# Patient Record
Sex: FEMALE | Race: WHITE | NOT HISPANIC OR LATINO | URBAN - METROPOLITAN AREA
[De-identification: names, ages, dates, MRNs, and addresses within clinical notes are randomized per-mention and may not be internally consistent; named-entity substitution may affect disease eponyms.]

---

## 2018-11-07 ENCOUNTER — EMERGENCY (EMERGENCY)
Facility: HOSPITAL | Age: 23
LOS: 0 days | Discharge: HOME | End: 2018-11-07
Admitting: PHYSICIAN ASSISTANT
Payer: COMMERCIAL

## 2018-11-07 VITALS
HEART RATE: 65 BPM | DIASTOLIC BLOOD PRESSURE: 65 MMHG | TEMPERATURE: 96 F | SYSTOLIC BLOOD PRESSURE: 118 MMHG | RESPIRATION RATE: 18 BRPM | OXYGEN SATURATION: 98 %

## 2018-11-07 DIAGNOSIS — M79.662 PAIN IN LEFT LOWER LEG: ICD-10-CM

## 2018-11-07 DIAGNOSIS — R60.0 LOCALIZED EDEMA: ICD-10-CM

## 2018-11-07 PROCEDURE — 93970 EXTREMITY STUDY: CPT | Mod: 26

## 2018-11-07 NOTE — ED PROVIDER NOTE - MEDICAL DECISION MAKING DETAILS
doppler prelim. negative; pt will follow up with PCP in 2-3 days; any new or worsening symptoms, pt will return to ER. doppler prelim. negative; pt will follow up with PCP in 2-3 days; any new or worsening symptoms, pt will return to ER.  Note complete

## 2018-11-07 NOTE — ED PROVIDER NOTE - PROVIDER TOKENS
FREE:[LAST:[[facility] Dr[facility]uhail],PHONE:[(   )    -],FAX:[(   )    -],ADDRESS:[Friday as scheduled]]

## 2018-11-07 NOTE — ED PROVIDER NOTE - OBJECTIVE STATEMENT
22 y.o. female with a PMHx of enuresis presented to the ER stating that her Left calf was bigger than her Right calf two days ago when she was trying on a boot.  Pt denies pain, trauma, travel, OCP use, chest pain, dizziness, SOB, family h/o clots.  Pt with mother at bedside. Anxious.

## 2018-11-07 NOTE — ED PROVIDER NOTE - PSYCHIATRIC, MLM
Alert and oriented to person, place, time/situation. mildly anxious mood and affect. no apparent risk to self or others.

## 2018-11-07 NOTE — ED PROVIDER NOTE - MUSCULOSKELETAL, MLM
no pedal edema noted to bilateral lower extremities, no calf tenderness bilaterally, pedal pulses 2+ bilaterally, Homans negative bilaterally

## 2019-04-05 PROBLEM — Z00.00 ENCOUNTER FOR PREVENTIVE HEALTH EXAMINATION: Status: ACTIVE | Noted: 2019-04-05

## 2019-06-10 ENCOUNTER — APPOINTMENT (OUTPATIENT)
Dept: NEUROLOGY | Facility: CLINIC | Age: 24
End: 2019-06-10

## 2019-06-10 ENCOUNTER — APPOINTMENT (OUTPATIENT)
Dept: NEUROLOGY | Facility: CLINIC | Age: 24
End: 2019-06-10
Payer: COMMERCIAL

## 2019-06-10 VITALS
HEART RATE: 75 BPM | WEIGHT: 144 LBS | SYSTOLIC BLOOD PRESSURE: 123 MMHG | BODY MASS INDEX: 25.52 KG/M2 | HEIGHT: 63 IN | DIASTOLIC BLOOD PRESSURE: 90 MMHG

## 2019-06-10 DIAGNOSIS — G31.9 DEGENERATIVE DISEASE OF NERVOUS SYSTEM, UNSPECIFIED: ICD-10-CM

## 2019-06-10 DIAGNOSIS — N39.44 NOCTURNAL ENURESIS: ICD-10-CM

## 2019-06-10 PROCEDURE — 99214 OFFICE O/P EST MOD 30 MIN: CPT

## 2019-06-10 NOTE — PHYSICAL EXAM
[FreeTextEntry1] : Orientation: oriented to person, oriented to place and oriented to time. \par Attention: normal concentrating ability and visual attention was not decreased. \par Language: no difficulty naming common objects, no difficulty repeating a phrase, no difficulty writing a sentence, fluency intact, comprehension intact and reading intact. \par Fund of knowledge: displays adequate knowledge of personal past history. \par Cranial Nerves: visual acuity intact bilaterally, visual fields full to confrontation, pupils equal round and reactive to light, extraocular motion intact  lack of smooth pursuit, facial sensation intact symmetrically, face symmetrical, hearing was intact bilaterally, tongue and palate midline, head turning and shoulder shrug symmetric and there was no tongue deviation with protrusion. \par Motor: muscle tone was normal in all four extremities, muscle strength was normal in all four extremities and normal bulk in all four extremities. \par Sensory exam: light touch, PP, Vibration was intact. \par Coordination:. dysmetria b/l on FTN and HTS; rebound on muscle testing. unable to tandem \par Deep tendon reflexes: \par absent reflexes throughout\par Plantar responses normal on the right, normal on the left.  \par

## 2019-06-10 NOTE — HISTORY OF PRESENT ILLNESS
[FreeTextEntry1] : Patient seen and examined for follow up.  She had her MRI brain done in [facility] but results were not sent prior to the visit.  She has not wet her bed since May 25th.  She is setting an alarm on her phone to wake her up every 2 hours to goto the bathroom.  during the day she has to use the bathroom as long as every 3 hours.  Can not usually hold it past 3 hours.  She doesn’t know what time is the last time she drinks before bed.  Goes to sleep around 8-9 and wakes up at usually 7:30am.\par NO new issues\par \par See prior intial visit note below from 1/7/2019 he is:\par Ms. Luu is a 23-year-old woman with past medical history of speech delay, cerebellar atrophy and some cognitive delay who was referred by primary medical doctor, Dr. Muñoz for evaluation of nocturia.  Patient states since 2013, she began having bedwetting episodes.  This occurs almost every night and sometimes every other night; however, they have not found way to treat it yet.  She has been seeing urologist and urologist tried a nasal spray, which eventually worked and then they gave her a pill; however they have not filled the prescription as prescription was $450 and they were afraid of the side effects of the medicine.  She also was noted to have asymmetry in her legs with the left calf wider than the right.  She had ultrasound done of the lower extremities, which was negative for any clots.  Family is unclear whether this is something new or something she has had since she was younger.  Mother says because of how stressful it was when she was younger. She does not have much recollection of Lexus’s childhood.  She was born normal spontaneous vaginal delivery.  She began walking before [age] year of age; however, was constantly falling and off balance began having physical therapy at that time.  She also had speech delay and did not start talking until about [age] years of age.  She has been developmental delayed since then.  She graduated from high school and has now finished first semester or freshmen year at [employer].  She took nine credits for first year and she has B+ average after the end of the semester taking some of the core classes.  She was worked up in the past according to the mother looking for many causes for why she had these issues; however, mother does not recall if anything was discovered.  There were no complications during the pregnancy or during the delivery.  She also has sibling, which is healthy with no issues.  She has a cousin who has epilepsy.  She has no urinary incontinence during the day, however, she has urinary urgency and if she has the urge to go to the bathroom she does not get to the bathroom immediately.  She will lose urine.  She has no headaches.  No neck pain.  No back pain.  No paresthesias.  No diplopia.  No problems with swallowing.  Her speech is slightly staccato; however, mother says it is better than when she was younger.  She sometimes gets dizzy if she gets up too quickly, but does not get dizzy if she turns too quickly.  She has a hard time focusing.  She has to usually think a little bit longer before she talks to find the right words.  No other medical issues.  No other asymmetry noted on her body.  She did have an MRI when she was young showing cerebellar atrophy.\par \par At this point he is going to my

## 2019-06-10 NOTE — DISCUSSION/SUMMARY
[FreeTextEntry1] : Lexus is a 23-year-old with a history of cerebellar atrophy since birth with cerebellar signs on exam here for followup of nocturnal enuresis. Likely this is related to a bladder issue such as a small bladder she should followup with a urologist for further testing such as urodynamics. She can followup with me as needed

## 2024-11-14 ENCOUNTER — EMERGENCY (EMERGENCY)
Facility: HOSPITAL | Age: 29
LOS: 0 days | Discharge: HOME | End: 2024-11-14
Attending: EMERGENCY MEDICINE
Payer: COMMERCIAL

## 2024-11-14 VITALS
OXYGEN SATURATION: 98 % | HEART RATE: 81 BPM | SYSTOLIC BLOOD PRESSURE: 141 MMHG | TEMPERATURE: 97 F | RESPIRATION RATE: 20 BRPM | DIASTOLIC BLOOD PRESSURE: 90 MMHG

## 2024-11-14 DIAGNOSIS — Z86.69 PERSONAL HISTORY OF OTHER DISEASES OF THE NERVOUS SYSTEM AND SENSE ORGANS: ICD-10-CM

## 2024-11-14 DIAGNOSIS — R29.810 FACIAL WEAKNESS: ICD-10-CM

## 2024-11-14 DIAGNOSIS — G51.0 BELL'S PALSY: ICD-10-CM

## 2024-11-14 PROCEDURE — 99284 EMERGENCY DEPT VISIT MOD MDM: CPT

## 2024-11-14 PROCEDURE — 99284 EMERGENCY DEPT VISIT MOD MDM: CPT | Mod: 25

## 2024-11-14 PROCEDURE — 70450 CT HEAD/BRAIN W/O DYE: CPT | Mod: 26,MC

## 2024-11-14 PROCEDURE — 82962 GLUCOSE BLOOD TEST: CPT

## 2024-11-14 PROCEDURE — 70450 CT HEAD/BRAIN W/O DYE: CPT | Mod: MC

## 2024-11-14 RX ORDER — VALACYCLOVIR HYDROCHLORIDE 500 MG/1
1 TABLET ORAL
Qty: 21 | Refills: 0
Start: 2024-11-14 | End: 2024-11-20

## 2024-11-14 RX ORDER — ACETAMINOPHEN 500 MG
650 TABLET ORAL ONCE
Refills: 0 | Status: COMPLETED | OUTPATIENT
Start: 2024-11-14 | End: 2024-11-14

## 2024-11-14 RX ORDER — GLYCERIN/PROPYLENE GLYCOL 0.6 %-0.6%
1 DROPPERETTE, SINGLE-USE DROP DISPENSER OPHTHALMIC (EYE)
Qty: 1 | Refills: 0
Start: 2024-11-14 | End: 2024-11-23

## 2024-11-14 RX ADMIN — Medication 650 MILLIGRAM(S): at 19:12

## 2024-11-14 RX ADMIN — Medication 60 MILLIGRAM(S): at 17:23

## 2024-11-14 NOTE — ED PROVIDER NOTE - CLINICAL SUMMARY MEDICAL DECISION MAKING FREE TEXT BOX
Clinical picture concerning for Bell's palsy.  Symptoms started today and patient immediately started on steroids.  Head CT shows no acute changes and patient has known cerebellar atrophy.  Patient's gait is at baseline.  Medications of treatment and percentage chance of recovery discussed in detail.  Will discharge on steroid course with antiviral agent.  Patient has follow-up with neurologist in 4 weeks and will give rapid referral to ENT.  Dry eye and home care discussed in detail and all questions and concerns addressed.  Patient given CAT scan report.    Full DC instructions discussed and patient knows when to seek immediate medical attention.  Patient has proper follow up.  All results discussed and patient aware they may require further work up.  Proper follow up ensured. Limitations of ED work up discussed.  Medications administered and prescribed/OTC home meds discussed.  All questions and concerns from patient or family addressed. Understanding of instructions verbalized.

## 2024-11-14 NOTE — ED PROVIDER NOTE - PATIENT PORTAL LINK FT
You can access the FollowMyHealth Patient Portal offered by [facility] by registering at the following website: [email or web address]. By joining [facility] FollowMyHealth portal, you will also be able to view your health information using other applications (apps) compatible with our system.

## 2024-11-14 NOTE — ED PROVIDER NOTE - NSFOLLOWUPINSTRUCTIONS_ED_ALL_ED_FT
Bell's Palsy: Care Instructions  Affected side of a man's face with Bell's palsy  Overview  Bell's palsy is paralysis or weakness of the muscles on one side of the face. Often people with Bell's palsy have a droop on one side of the mouth and have trouble completely shutting the eye on the same side. Bell's palsy can also interfere with the sense of taste. These things happen when a nerve in your face becomes inflamed. Bell's palsy is not caused by a stroke. The cause of the nerve inflammation is not known.    Bell's palsy usually gets better on its own in a few months. In some cases medicine is prescribed.    Follow-up care is a key part of your treatment and safety. Be sure to make and go to all appointments, and call your doctor or nurse advice line ([phone] in most provinces and territories) if you are having problems. It's also a good idea to know your test results and keep a list of the medicines you take.    How can you care for yourself at home?  Take your medicines exactly as prescribed. Call your doctor or nurse advice line if you think you are having a problem with your medicine. You will get more details on the specific medicines your doctor prescribes.  Use artificial tears or ointment if your eyes are too dry. Bell's palsy can make your lower eyelid droop, causing a dry eye.  If you cannot completely close your eye, consider using clear medical tape to tape your eye shut while you sleep.  Wear glasses or goggles to keep dust and dirt out of your eye.  Brush and floss your teeth often to help prevent tooth decay. Bell's palsy can dry up the spit on one side of your mouth. This increases the risk of tooth decay.  When should you call for help?  	  Call 911 anytime you think you may need emergency care. For example, call if:    You have symptoms of a stroke. These may include:  Sudden numbness, tingling, weakness, or loss of movement in your face, arm, or leg, especially on only one side of your body.  Sudden vision changes.  Sudden trouble speaking.  Sudden confusion or trouble understanding simple statements.  Sudden problems with walking or balance.  A sudden, severe headache that is different from past headaches.  Call your doctor or nurse advice line now or seek immediate medical care if:    You have a skin rash or eye pain or redness, or light bothers your eyes.  You have a new or worse headache.  Watch closely for changes in your health, and be sure to contact your doctor or nurse advice line if:    You do not get better as expected. Bell's Palsy: Care Instructions    Your clinical presentation was classic for [place] Palsy. Please take Prednisone as prescribed and too completion, as well as the antiviral medication Valacyclovir. As discussed, please make sure your eye is completely closing and use the artificial tears.    You have an upcoming Neurology appointment within the next 4 weeks. Please bring CT scan results. You have had balance issues for a while and it appears that your balance center in your brain (your cerebellum) shows severe atrophy which is abnormal for a person your age. You will likely require further work up and testing.    We also advise for you to follow up with the ENT doctor to recheck this [place] Palsy.  Our Emergency Department Referral Coordinators will be reaching out to you in the next 24-48 hours from 9:00am to 5:00pm with a follow up appointment. Please expect a phone call from the hospital in that time frame. If you do not receive a call or if you have any questions or concerns, you can reach them at   [phone]      Overview  Bell's palsy is paralysis or weakness of the muscles on one side of the face. Often people with Bell's palsy have a droop on one side of the mouth and have trouble completely shutting the eye on the same side. Bell's palsy can also interfere with the sense of taste. These things happen when a nerve in your face becomes inflamed. Bell's palsy is not caused by a stroke. The cause of the nerve inflammation is not known.    Bell's palsy usually gets better on its own in a few months. In some cases medicine is prescribed.    Follow-up care is a key part of your treatment and safety. Be sure to make and go to all appointments, and call your doctor or nurse advice line ([phone] in most provinces and territories) if you are having problems. It's also a good idea to know your test results and keep a list of the medicines you take.    How can you care for yourself at home?  Take your medicines exactly as prescribed. Call your doctor or nurse advice line if you think you are having a problem with your medicine. You will get more details on the specific medicines your doctor prescribes.  Use artificial tears or ointment if your eyes are too dry. Bell's palsy can make your lower eyelid droop, causing a dry eye.  If you cannot completely close your eye, consider using clear medical tape to tape your eye shut while you sleep.  Wear glasses or goggles to keep dust and dirt out of your eye.  Brush and floss your teeth often to help prevent tooth decay. Bell's palsy can dry up the spit on one side of your mouth. This increases the risk of tooth decay.  When should you call for help?  	  Call 911 anytime you think you may need emergency care. For example, call if:    You have symptoms of a stroke. These may include:  Sudden numbness, tingling, weakness, or loss of movement in your face, arm, or leg, especially on only one side of your body.  Sudden vision changes.  Sudden trouble speaking.  Sudden confusion or trouble understanding simple statements.  Sudden problems with walking or balance.  A sudden, severe headache that is different from past headaches.  Call your doctor or nurse advice line now or seek immediate medical care if:    You have a skin rash or eye pain or redness, or light bothers your eyes.  You have a new or worse headache.  Watch closely for changes in your health, and be sure to contact your doctor or nurse advice line if:    You do not get better as expected.

## 2024-11-14 NOTE — ED PROVIDER NOTE - OBJECTIVE STATEMENT
28-year-old female past medical history of severe cerebellar atrophy with unstable gait presents to the emergency department for right-sided facial droop that patient noticed today.  Patient was otherwise in normal state of health and denies any fever or chills.  She reports today she had sensitivity to her ear and mild tenderness.  Patient then felt like she had trouble moving her mouth and looked in the mirror and noticed she had facial droop.  Patient let mother know was brought immediately to the emergency department.  Last menstrual period just started.  Patient denies any numbness, weakness, tingling to arms or legs.

## 2024-11-14 NOTE — ED PROVIDER NOTE - NSICDXPASTMEDICALHX_GEN_ALL_CORE_FT
PAST MEDICAL HISTORY:  No pertinent past medical history      PAST MEDICAL HISTORY:  History of cerebellar ataxia     No pertinent past medical history

## 2024-11-14 NOTE — ED PROVIDER NOTE - PHYSICAL EXAMINATION
VITAL SIGNS: I have reviewed nursing notes and confirm.  CONSTITUTIONAL: well-appearing, non-toxic, NAD  SKIN: Warm dry, normal skin turgor  HEAD: NCAT  EYES: EOMI, PERRLA, no scleral icterus  ENT: Moist mucous membranes, normal pharynx with no erythema or exudates  NECK: Supple; non tender. Full ROM. No cervical LAD  CARD: RRR, no murmurs, rubs or gallops  RESP: clear to ausculation b/l.  No rales, rhonchi, or wheezing.  ABD: soft, + BS, non-tender, non-distended, no rebound or guarding. No CVA tenderness  EXT: Full ROM, no bony tenderness, no pedal edema, no calf tenderness  NEURO: normal motor. normal sensory.  Right facial droop with weakness closing right eye. All CN VII functions decreased on the right. Patient has baseline unstable gait but is able to walk on her own without issue.  PSYCH: Cooperative, appropriate.

## 2024-11-14 NOTE — ED ADULT TRIAGE NOTE - CHIEF COMPLAINT QUOTE
pt complaining of L sided facial droop and slurred speech. pts mother states her speech is at baseline and her off balance is at baseline.

## 2024-11-14 NOTE — ED PROVIDER NOTE - DIFFERENTIAL DIAGNOSIS
Differential Diagnosis The differential diagnosis for patients clinical presentation includes but is not limited to:  [place] palsy, stroke, seizure

## 2024-11-21 NOTE — CHART NOTE - NSCHARTNOTEFT_GEN_A_CORE
sent to ENT 11/15 LF / Update inquired 11/19 - JL / Appt scheduled on 11/22 @ 9:30am @ [address], as per ENT, left message with appt details 11/21 - DK (ENT Referral)

## 2024-11-22 ENCOUNTER — APPOINTMENT (OUTPATIENT)
Dept: OTOLARYNGOLOGY | Facility: CLINIC | Age: 29
End: 2024-11-22

## 2024-12-12 ENCOUNTER — APPOINTMENT (OUTPATIENT)
Dept: NEUROLOGY | Facility: CLINIC | Age: 29
End: 2024-12-12
Payer: COMMERCIAL

## 2024-12-12 ENCOUNTER — NON-APPOINTMENT (OUTPATIENT)
Age: 29
End: 2024-12-12

## 2024-12-12 VITALS
WEIGHT: 144 LBS | SYSTOLIC BLOOD PRESSURE: 121 MMHG | BODY MASS INDEX: 25.52 KG/M2 | HEIGHT: 63 IN | OXYGEN SATURATION: 97 % | DIASTOLIC BLOOD PRESSURE: 81 MMHG | HEART RATE: 74 BPM

## 2024-12-12 DIAGNOSIS — G31.9 DEGENERATIVE DISEASE OF NERVOUS SYSTEM, UNSPECIFIED: ICD-10-CM

## 2024-12-12 PROCEDURE — G2211 COMPLEX E/M VISIT ADD ON: CPT | Mod: NC

## 2024-12-12 PROCEDURE — 99204 OFFICE O/P NEW MOD 45 MIN: CPT
